# Patient Record
Sex: FEMALE | Race: WHITE | HISPANIC OR LATINO | URBAN - NONMETROPOLITAN AREA
[De-identification: names, ages, dates, MRNs, and addresses within clinical notes are randomized per-mention and may not be internally consistent; named-entity substitution may affect disease eponyms.]

---

## 2022-06-28 ENCOUNTER — OFFICE VISIT (OUTPATIENT)
Dept: URBAN - NONMETROPOLITAN AREA CLINIC 3 | Facility: CLINIC | Age: 67
End: 2022-06-28
Payer: COMMERCIAL

## 2022-06-28 DIAGNOSIS — H25.011 CORTICAL AGE-RELATED CATARACT, RIGHT EYE: Primary | ICD-10-CM

## 2022-06-28 DIAGNOSIS — H52.4 PRESBYOPIA: ICD-10-CM

## 2022-06-28 DIAGNOSIS — H43.392 OTHER VITREOUS OPACITIES, LEFT EYE: ICD-10-CM

## 2022-06-28 DIAGNOSIS — H00.15 CHALAZION OF LEFT LOWER EYELID: ICD-10-CM

## 2022-06-28 PROCEDURE — 99203 OFFICE O/P NEW LOW 30 MIN: CPT | Performed by: OPTOMETRIST

## 2022-06-28 ASSESSMENT — VISUAL ACUITY
OS: 20/20
OD: 20/20

## 2022-06-28 ASSESSMENT — INTRAOCULAR PRESSURE
OD: 14
OS: 14

## 2022-06-28 NOTE — IMPRESSION/PLAN
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 Plan: OS: Discussed diagnosis in detail with patient. Discussed risks and benefits and patient understands. Will continue to observe condition and or symptoms.

## 2022-06-28 NOTE — IMPRESSION/PLAN
Impression: Chalazion of left lower eyelid: H00.15. Plan: OS: Discussed risks and benefits and patient understands. Will continue to observe condition and or symptoms.  Discussed exc eros pt will consider

## 2022-06-28 NOTE — IMPRESSION/PLAN
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 Plan: Discussed cataract diagnosis with the patient. Discussed and reviewed treatment options for cataracts. Surgical intervention not necessary at this time. Advised re-evaluation for progressive vision loss.

## 2025-05-07 ENCOUNTER — OFFICE VISIT (OUTPATIENT)
Dept: URBAN - NONMETROPOLITAN AREA CLINIC 3 | Facility: CLINIC | Age: 70
End: 2025-05-07
Payer: COMMERCIAL

## 2025-05-07 DIAGNOSIS — H04.123 TEAR FILM INSUFFICIENCY OF BILATERAL LACRIMAL GLANDS: ICD-10-CM

## 2025-05-07 DIAGNOSIS — H11.31 SUBCONJUNCTIVAL HEMORRHAGE OF RIGHT EYE: Primary | ICD-10-CM

## 2025-05-07 PROCEDURE — 99213 OFFICE O/P EST LOW 20 MIN: CPT | Performed by: OPTOMETRIST

## 2025-05-07 RX ORDER — CYCLOSPORINE 0.5 MG/ML
0.05 % EMULSION OPHTHALMIC
Qty: 60 | Refills: 5 | Status: ACTIVE
Start: 2025-05-07

## 2025-05-07 ASSESSMENT — KERATOMETRY
OD: 7.72
OS: 7.69

## 2025-05-07 ASSESSMENT — INTRAOCULAR PRESSURE
OD: 12
OS: 12

## 2025-08-13 ENCOUNTER — OFFICE VISIT (OUTPATIENT)
Dept: URBAN - NONMETROPOLITAN AREA CLINIC 3 | Facility: CLINIC | Age: 70
End: 2025-08-13
Payer: COMMERCIAL

## 2025-08-13 DIAGNOSIS — H04.123 TEAR FILM INSUFFICIENCY OF BILATERAL LACRIMAL GLANDS: ICD-10-CM

## 2025-08-13 DIAGNOSIS — H43.393 OTHER VITREOUS OPACITIES, BILATERAL: ICD-10-CM

## 2025-08-13 DIAGNOSIS — H25.813 COMBINED FORMS OF AGE-RELATED CATARACT, BILATERAL: ICD-10-CM

## 2025-08-13 DIAGNOSIS — E11.9 TYPE 2 DIABETES MELLITUS WITHOUT COMPLICATIONS: Primary | ICD-10-CM

## 2025-08-13 PROCEDURE — 99213 OFFICE O/P EST LOW 20 MIN: CPT | Performed by: OPTOMETRIST

## 2025-08-13 ASSESSMENT — INTRAOCULAR PRESSURE
OS: 13
OD: 13